# Patient Record
Sex: MALE | Race: WHITE | Employment: OTHER | URBAN - METROPOLITAN AREA
[De-identification: names, ages, dates, MRNs, and addresses within clinical notes are randomized per-mention and may not be internally consistent; named-entity substitution may affect disease eponyms.]

---

## 2023-03-05 ENCOUNTER — HOSPITAL ENCOUNTER (EMERGENCY)
Age: 53
Discharge: HOME | End: 2023-03-05
Attending: EMERGENCY MEDICINE
Payer: MEDICARE

## 2023-03-05 VITALS
BODY MASS INDEX: 42.66 KG/M2 | OXYGEN SATURATION: 96 % | HEART RATE: 65 BPM | SYSTOLIC BLOOD PRESSURE: 144 MMHG | WEIGHT: 315 LBS | RESPIRATION RATE: 16 BRPM | TEMPERATURE: 98.1 F | HEIGHT: 72 IN | DIASTOLIC BLOOD PRESSURE: 99 MMHG

## 2023-03-05 DIAGNOSIS — G47.00 INSOMNIA, UNSPECIFIED TYPE: Primary | ICD-10-CM

## 2023-03-05 LAB
ALBUMIN SERPL-MCNC: 3.6 G/DL (ref 3.5–5)
ALBUMIN/GLOB SERPL: 0.8 (ref 0.4–1.6)
ALP SERPL-CCNC: 108 U/L (ref 50–136)
ALT SERPL-CCNC: 25 U/L (ref 12–65)
AMPHET UR QL SCN: NEGATIVE
ANION GAP SERPL CALC-SCNC: 7 MMOL/L (ref 2–11)
APAP SERPL-MCNC: <2 UG/ML (ref 10–30)
AST SERPL-CCNC: 14 U/L (ref 15–37)
BARBITURATES UR QL SCN: NEGATIVE
BASOPHILS # BLD: 0.1 K/UL (ref 0–0.2)
BASOPHILS NFR BLD: 1 % (ref 0–2)
BENZODIAZ UR QL: NEGATIVE
BILIRUB SERPL-MCNC: 0.5 MG/DL (ref 0.2–1.1)
BUN SERPL-MCNC: 21 MG/DL (ref 6–23)
CALCIUM SERPL-MCNC: 9.6 MG/DL (ref 8.3–10.4)
CANNABINOIDS UR QL SCN: NEGATIVE
CHLORIDE SERPL-SCNC: 104 MMOL/L (ref 101–110)
CO2 SERPL-SCNC: 25 MMOL/L (ref 21–32)
COCAINE UR QL SCN: NEGATIVE
CREAT SERPL-MCNC: 1.17 MG/DL (ref 0.8–1.5)
DIFFERENTIAL METHOD BLD: ABNORMAL
EOSINOPHIL # BLD: 0.5 K/UL (ref 0–0.8)
EOSINOPHIL NFR BLD: 4 % (ref 0.5–7.8)
ERYTHROCYTE [DISTWIDTH] IN BLOOD BY AUTOMATED COUNT: 13.4 % (ref 11.9–14.6)
ETHANOL SERPL-MCNC: <3 MG/DL (ref 0–0.08)
GLOBULIN SER CALC-MCNC: 4.3 G/DL (ref 2.8–4.5)
GLUCOSE SERPL-MCNC: 181 MG/DL (ref 65–100)
HCT VFR BLD AUTO: 42.6 % (ref 41.1–50.3)
HGB BLD-MCNC: 14.2 G/DL (ref 13.6–17.2)
IMM GRANULOCYTES # BLD AUTO: 0 K/UL (ref 0–0.5)
IMM GRANULOCYTES NFR BLD AUTO: 0 % (ref 0–5)
LYMPHOCYTES # BLD: 3.1 K/UL (ref 0.5–4.6)
LYMPHOCYTES NFR BLD: 29 % (ref 13–44)
MAGNESIUM SERPL-MCNC: 1.9 MG/DL (ref 1.8–2.4)
MCH RBC QN AUTO: 28.1 PG (ref 26.1–32.9)
MCHC RBC AUTO-ENTMCNC: 33.3 G/DL (ref 31.4–35)
MCV RBC AUTO: 84.4 FL (ref 82–102)
METHADONE UR QL: NEGATIVE
MONOCYTES # BLD: 0.8 K/UL (ref 0.1–1.3)
MONOCYTES NFR BLD: 8 % (ref 4–12)
NEUTS SEG # BLD: 6.1 K/UL (ref 1.7–8.2)
NEUTS SEG NFR BLD: 58 % (ref 43–78)
NRBC # BLD: 0 K/UL (ref 0–0.2)
OPIATES UR QL: NEGATIVE
PCP UR QL: NEGATIVE
PLATELET # BLD AUTO: 253 K/UL (ref 150–450)
PMV BLD AUTO: 8 FL (ref 9.4–12.3)
POTASSIUM SERPL-SCNC: 3.7 MMOL/L (ref 3.5–5.1)
PROT SERPL-MCNC: 7.9 G/DL (ref 6.3–8.2)
RBC # BLD AUTO: 5.05 M/UL (ref 4.23–5.6)
SALICYLATES SERPL-MCNC: <1.7 MG/DL (ref 2.8–20)
SODIUM SERPL-SCNC: 136 MMOL/L (ref 133–143)
WBC # BLD AUTO: 10.5 K/UL (ref 4.3–11.1)

## 2023-03-05 PROCEDURE — 93005 ELECTROCARDIOGRAM TRACING: CPT | Performed by: EMERGENCY MEDICINE

## 2023-03-05 PROCEDURE — 99284 EMERGENCY DEPT VISIT MOD MDM: CPT

## 2023-03-05 PROCEDURE — 80307 DRUG TEST PRSMV CHEM ANLYZR: CPT

## 2023-03-05 PROCEDURE — 80179 DRUG ASSAY SALICYLATE: CPT

## 2023-03-05 PROCEDURE — 85025 COMPLETE CBC W/AUTO DIFF WBC: CPT

## 2023-03-05 PROCEDURE — 80053 COMPREHEN METABOLIC PANEL: CPT

## 2023-03-05 PROCEDURE — 80143 DRUG ASSAY ACETAMINOPHEN: CPT

## 2023-03-05 PROCEDURE — 83735 ASSAY OF MAGNESIUM: CPT

## 2023-03-05 PROCEDURE — 82077 ASSAY SPEC XCP UR&BREATH IA: CPT

## 2023-03-05 RX ORDER — LITHIUM CARBONATE 300 MG/1
1200 TABLET, FILM COATED, EXTENDED RELEASE ORAL DAILY
COMMUNITY

## 2023-03-05 RX ORDER — TRAZODONE HYDROCHLORIDE 50 MG/1
50 TABLET ORAL NIGHTLY
COMMUNITY

## 2023-03-05 RX ORDER — PROPRANOLOL HYDROCHLORIDE 20 MG/1
20 TABLET ORAL 3 TIMES DAILY
COMMUNITY

## 2023-03-05 RX ORDER — CLONAZEPAM 1 MG/1
1.5 TABLET ORAL 2 TIMES DAILY PRN
COMMUNITY

## 2023-03-05 RX ORDER — ZOLPIDEM TARTRATE 10 MG/1
TABLET ORAL NIGHTLY PRN
COMMUNITY

## 2023-03-05 ASSESSMENT — LIFESTYLE VARIABLES
HOW MANY STANDARD DRINKS CONTAINING ALCOHOL DO YOU HAVE ON A TYPICAL DAY: PATIENT DOES NOT DRINK
HOW OFTEN DO YOU HAVE A DRINK CONTAINING ALCOHOL: NEVER

## 2023-03-05 ASSESSMENT — PAIN - FUNCTIONAL ASSESSMENT
PAIN_FUNCTIONAL_ASSESSMENT: NONE - DENIES PAIN

## 2023-03-05 ASSESSMENT — PATIENT HEALTH QUESTIONNAIRE - PHQ9: SUM OF ALL RESPONSES TO PHQ QUESTIONS 1-9: 18

## 2023-03-05 ASSESSMENT — ENCOUNTER SYMPTOMS
RESPIRATORY NEGATIVE: 1
GASTROINTESTINAL NEGATIVE: 1

## 2023-03-05 ASSESSMENT — PAIN SCALES - GENERAL: PAINLEVEL_OUTOF10: 0

## 2023-03-05 NOTE — ED TRIAGE NOTES
Patient was prescribed zolpidem 10 mg 30 pills on 3/3/23 for insomnia. Patient has taken 26 pills since filling prescription. Patient reports a brief period on Friday where he was able to sleep for 3 hrs. Patient denies any S.I. at this time.

## 2023-03-05 NOTE — ED PROVIDER NOTES
Emergency Department Provider Note                   PCP:                No primary care provider on file. Age: 47 y.o. Sex: male     DISPOSITION Decision To Discharge 03/05/2023 08:51:47 PM       ICD-10-CM    1. Insomnia, unspecified type  G47.00           MEDICAL DECISION MAKING  Complexity of Problems Addressed:  1 or more acute illnesses that pose a threat to life or bodily function. Data Reviewed and Analyzed:  Category 1:   I reviewed records from an external source: provider visit notes from outside specialist.  I ordered each unique test.  I reviewed the results of each unique test.        Category 2:   I independently ordered and reviewed the EKG. Category 3: Discussion of management or test interpretation. Patient is 52-year-old male who took several Ambien before arrival trying to sleep. He started to feel high so he wanted to be evaluated. Adamantly denies trying to hurt himself. I spoke to poison control who recommended 4 to 6-hour observation. They recommended supportive treatment. Did obtain psychiatric evaluation because patient has been taking way too much Ambien the past few days. Psychiatry has evaluated the patient and do believe he stable for discharge. He has an appointment with psychiatry and primary care tomorrow. Encouraged him to take his medication only as prescribed. He is to return if worsening. He is awake alert ambulatory around the department without difficulty. Work-up unrevealing in the emergency department. ED Course as of 03/05/23 2054   [facility] Mar 05, 2023   1817 EKG 12 Lead  EKG shows sinus rhythm at rate of 65. No acute ischemic changes. No STEMI. QTc 457. [SARA]   1240 I spoke to poison control who recommends 4-6hr obs, if symptomatic, prolonged obs. Otherwise, if awake, alert, stable vitals, OK to go home. [SARA]   2051 Telepsychiatry has evaluated patient and believes that he is stable for discharge.   Patient has now been observed for 4 hours is awake alert and oriented feeling much better. Ambulatory without difficulty. Stable vital signs. Will discharge. He has follow-up tomorrow with psychiatrist and primary doctor. Strict return precautions given. [SARA]      ED Course User Index  [SARA] KERRIE Rivas       Risk of Complications and/or Morbidity of Patient Management:  Discussion with external consultants     Sisi Mercado is a 47 y.o. male who presents to the Emergency Department with chief complaint of    Chief Complaint   Patient presents with    Insomnia      Patient is a 52-year-old male with history of insomnia, bipolar, anxiety. He presents stating that he feels high after he took 7 Ambien within a 3-hour period prior to arrival.  He says he took 2 Klonopin with 2 Ambien at 1 PM, and at 3 PM took 3 Ambien, and then around 4 or 5 PM he took 2 more Ambien. His Klonopin of 1 mg and his Ambien or 10 mg. He says that yesterday he took 12 Ambien throughout the day, and the day prior took 5 Ambien. He tells me he is not suicidal and has no thoughts of hurting himself, but just is trying to sleep. I reached out to poison control who recommended a 4 to 6-hour observation, and if he is awake and alert at that point in time he could be discharged. Recommended supportive treatment meantime. Review of Systems   Constitutional:  Positive for fatigue. HENT: Negative. Respiratory: Negative. Cardiovascular: Negative. Gastrointestinal: Negative. Genitourinary: Negative. All other systems reviewed and are negative. Vitals signs and nursing note reviewed. Patient Vitals for the past 4 hrs:   Temp Pulse Resp BP SpO2   03/05/23 1733 98.1 °F (36.7 °C) 65 18 125/78 97 %          Physical Exam  Vitals and nursing note reviewed. Constitutional:       General: He is not in acute distress. Appearance: Normal appearance. He is obese. He is not ill-appearing or toxic-appearing. HENT:      Head: Normocephalic.    Eyes: Extraocular Movements: Extraocular movements intact. Cardiovascular:      Rate and Rhythm: Normal rate and regular rhythm. Pulses: Normal pulses. Heart sounds: Normal heart sounds. Pulmonary:      Effort: Pulmonary effort is normal.      Breath sounds: Normal breath sounds. Abdominal:      Palpations: Abdomen is soft. Tenderness: There is no abdominal tenderness. Musculoskeletal:         General: No swelling or tenderness. Normal range of motion. Cervical back: Normal range of motion and neck supple. Skin:     General: Skin is warm and dry. Findings: No rash. Neurological:      General: No focal deficit present. Mental Status: He is alert and oriented to person, place, and time. Mental status is at baseline. Comments: Awake alert and oriented. Appears tired. Conversant. Psychiatric:         Mood and Affect: Mood normal.         Behavior: Behavior normal.         Thought Content: Thought content normal.        Procedures     Orders Placed This Encounter   Procedures    CBC with Auto Differential    CMP    Salicylate    Acetaminophen Level    ETOH    Magnesium    Urine Drug Screen    Complete CSSRS Screen    Complete SBIRT Screen    POCT Urine Dipstick    EKG 12 Lead        Medications - No data to display    New Prescriptions    No medications on file        Past Medical History:   Diagnosis Date    Anxiety     Bipolar 1 disorder, depressed ([address]         History reviewed. No pertinent surgical history. History reviewed. No pertinent family history.      Social History     Socioeconomic History    Marital status: Single     Spouse name: None    Number of children: None    Years of education: None    Highest education level: None   Tobacco Use    Smoking status: Former     Types: Cigarettes     Quit date: 2023     Years since quittin.0    Smokeless tobacco: Never   Vaping Use    Vaping Use: Never used   Substance and Sexual Activity    Alcohol use: Not Currently    Drug use: Not Currently        Allergies: Pcn [penicillins]    Previous Medications    CLONAZEPAM (KLONOPIN) 1 MG TABLET    Take 1.5 mg by mouth 2 times daily as needed. LITHIUM (LITHOBID) 300 MG EXTENDED RELEASE TABLET    Take 1,200 mg by mouth Daily    LURASIDONE (LATUDA) 120 MG TABLET    Take 120 mg by mouth daily    METFORMIN (GLUCOPHAGE) 1000 MG TABLET    Take 1,000 mg by mouth 2 times daily (with meals)    PROPRANOLOL (INDERAL) 20 MG TABLET    Take 20 mg by mouth 3 times daily    TRAZODONE (DESYREL) 50 MG TABLET    Take 50 mg by mouth nightly    ZOLPIDEM (AMBIEN) 10 MG TABLET    Take by mouth nightly as needed for Sleep.         Results for orders placed or performed during the hospital encounter of 03/05/23   CBC with Auto Differential   Result Value Ref Range    WBC 10.5 4.3 - 11.1 K/uL    RBC 5.05 4.23 - 5.6 M/uL    Hemoglobin 14.2 13.6 - 17.2 g/dL    Hematocrit 42.6 41.1 - 50.3 %    MCV 84.4 82.0 - 102.0 FL    MCH 28.1 26.1 - 32.9 PG    MCHC 33.3 31.4 - 35.0 g/dL    RDW 13.4 11.9 - 14.6 %    Platelets 897 908 - 201 K/uL    MPV 8.0 (L) 9.4 - 12.3 FL    nRBC 0.00 0.0 - 0.2 K/uL    Differential Type AUTOMATED      Seg Neutrophils 58 43 - 78 %    Lymphocytes 29 13 - 44 %    Monocytes 8 4.0 - 12.0 %    Eosinophils % 4 0.5 - 7.8 %    Basophils 1 0.0 - 2.0 %    Immature Granulocytes 0 0.0 - 5.0 %    Segs Absolute 6.1 1.7 - 8.2 K/UL    Absolute Lymph # 3.1 0.5 - 4.6 K/UL    Absolute Mono # 0.8 0.1 - 1.3 K/UL    Absolute Eos # 0.5 0.0 - 0.8 K/UL    Basophils Absolute 0.1 0.0 - 0.2 K/UL    Absolute Immature Granulocyte 0.0 0.0 - 0.5 K/UL   CMP   Result Value Ref Range    Sodium 136 133 - 143 mmol/L    Potassium 3.7 3.5 - 5.1 mmol/L    Chloride 104 101 - 110 mmol/L    CO2 25 21 - 32 mmol/L    Anion Gap 7 2 - 11 mmol/L    Glucose 181 (H) 65 - 100 mg/dL    BUN 21 6 - 23 MG/DL    Creatinine 1.17 0.8 - 1.5 MG/DL    Est, Glom Filt Rate >60 >60 ml/min/1.73m2    Calcium 9.6 8.3 - 10.4 MG/DL    Total Bilirubin 0.5 0.2 - 1.1 MG/DL    ALT 25 12 - 65 U/L    AST 14 (L) 15 - 37 U/L    Alk Phosphatase 108 50 - 136 U/L    Total Protein 7.9 6.3 - 8.2 g/dL    Albumin 3.6 3.5 - 5.0 g/dL    Globulin 4.3 2.8 - 4.5 g/dL    Albumin/Globulin Ratio 0.8 0.4 - 1.6     Salicylate   Result Value Ref Range    Salicylate, Serum <5.8 (L) 2.8 - 20.0 MG/DL   Acetaminophen Level   Result Value Ref Range    Acetaminophen Level <2 (L) 10 - 30 ug/mL   ETOH   Result Value Ref Range    Ethanol Lvl <3 MG/DL   Magnesium   Result Value Ref Range    Magnesium 1.9 1.8 - 2.4 mg/dL   Urine Drug Screen   Result Value Ref Range    PCP, Urine Negative      Benzodiazepines, Urine Negative      Cocaine, Urine Negative      Amphetamine, Urine Negative      Methadone, Urine Negative      THC, TH-Cannabinol, Urine Negative      Opiates, Urine Negative      Barbiturates, Urine Negative          No orders to display                     Voice dictation software was used during the making of this note. This software is not perfect and grammatical and other typographical errors may be present. This note has not been completely proofread for errors.        Juan Pablo Trevino  03/05/23 2054

## 2023-03-06 LAB
EKG ATRIAL RATE: 65 BPM
EKG DIAGNOSIS: NORMAL
EKG P AXIS: 17 DEGREES
EKG P-R INTERVAL: 179 MS
EKG Q-T INTERVAL: 439 MS
EKG QRS DURATION: 103 MS
EKG QTC CALCULATION (BAZETT): 457 MS
EKG R AXIS: 25 DEGREES
EKG T AXIS: 67 DEGREES
EKG VENTRICULAR RATE: 65 BPM

## 2023-03-06 NOTE — DISCHARGE INSTRUCTIONS
See your doctor tomorrow for close follow up. Return if worsening. Only take medications as prescribed.

## 2023-03-12 ENCOUNTER — HOSPITAL ENCOUNTER (EMERGENCY)
Age: 53
Discharge: HOME | End: 2023-03-13
Attending: EMERGENCY MEDICINE
Payer: MEDICARE

## 2023-03-12 DIAGNOSIS — G47.00 INSOMNIA, UNSPECIFIED TYPE: Primary | ICD-10-CM

## 2023-03-12 LAB
ANION GAP SERPL CALC-SCNC: 8 MMOL/L (ref 2–11)
BUN SERPL-MCNC: 16 MG/DL (ref 6–23)
CALCIUM SERPL-MCNC: 9.4 MG/DL (ref 8.3–10.4)
CHLORIDE SERPL-SCNC: 105 MMOL/L (ref 101–110)
CO2 SERPL-SCNC: 24 MMOL/L (ref 21–32)
CREAT SERPL-MCNC: 1.03 MG/DL (ref 0.8–1.5)
ERYTHROCYTE [DISTWIDTH] IN BLOOD BY AUTOMATED COUNT: 13 % (ref 11.9–14.6)
GLUCOSE SERPL-MCNC: 229 MG/DL (ref 65–100)
HCT VFR BLD AUTO: 42.9 % (ref 41.1–50.3)
HGB BLD-MCNC: 14.7 G/DL (ref 13.6–17.2)
MCH RBC QN AUTO: 28.3 PG (ref 26.1–32.9)
MCHC RBC AUTO-ENTMCNC: 34.3 G/DL (ref 31.4–35)
MCV RBC AUTO: 82.5 FL (ref 82–102)
NRBC # BLD: 0 K/UL (ref 0–0.2)
PLATELET # BLD AUTO: 257 K/UL (ref 150–450)
PMV BLD AUTO: 8 FL (ref 9.4–12.3)
POTASSIUM SERPL-SCNC: 4.3 MMOL/L (ref 3.5–5.1)
RBC # BLD AUTO: 5.2 M/UL (ref 4.23–5.6)
SODIUM SERPL-SCNC: 137 MMOL/L (ref 133–143)
WBC # BLD AUTO: 9.1 K/UL (ref 4.3–11.1)

## 2023-03-12 PROCEDURE — 85027 COMPLETE CBC AUTOMATED: CPT

## 2023-03-12 PROCEDURE — 6370000000 HC RX 637 (ALT 250 FOR IP): Performed by: EMERGENCY MEDICINE

## 2023-03-12 PROCEDURE — 80048 BASIC METABOLIC PNL TOTAL CA: CPT

## 2023-03-12 PROCEDURE — 80178 ASSAY OF LITHIUM: CPT

## 2023-03-12 PROCEDURE — 99283 EMERGENCY DEPT VISIT LOW MDM: CPT | Performed by: EMERGENCY MEDICINE

## 2023-03-12 RX ORDER — PROPRANOLOL HYDROCHLORIDE 20 MG/1
20 TABLET ORAL 3 TIMES DAILY
Qty: 9 TABLET | Refills: 0 | Status: SHIPPED | OUTPATIENT
Start: 2023-03-12 | End: 2023-03-15

## 2023-03-12 RX ORDER — PROPRANOLOL HYDROCHLORIDE 10 MG/1
20 TABLET ORAL 3 TIMES DAILY
Status: DISCONTINUED | OUTPATIENT
Start: 2023-03-12 | End: 2023-03-13 | Stop reason: HOSPADM

## 2023-03-12 RX ORDER — CHLORDIAZEPOXIDE HYDROCHLORIDE 25 MG/1
25 CAPSULE, GELATIN COATED ORAL
Status: COMPLETED | OUTPATIENT
Start: 2023-03-12 | End: 2023-03-12

## 2023-03-12 RX ORDER — DIAZEPAM 2 MG/1
2 TABLET ORAL EVERY 12 HOURS PRN
Qty: 10 TABLET | Refills: 0 | Status: SHIPPED | OUTPATIENT
Start: 2023-03-12 | End: 2023-03-17

## 2023-03-12 RX ORDER — DIAZEPAM 5 MG/1
5 TABLET ORAL EVERY 6 HOURS PRN
Status: DISCONTINUED | OUTPATIENT
Start: 2023-03-12 | End: 2023-03-13 | Stop reason: HOSPADM

## 2023-03-12 RX ADMIN — CHLORDIAZEPOXIDE HYDROCHLORIDE 25 MG: 25 CAPSULE ORAL at 23:18

## 2023-03-12 RX ADMIN — DIAZEPAM 5 MG: 5 TABLET ORAL at 22:00

## 2023-03-12 RX ADMIN — PROPRANOLOL HYDROCHLORIDE 20 MG: 10 TABLET ORAL at 22:15

## 2023-03-12 ASSESSMENT — ENCOUNTER SYMPTOMS
SHORTNESS OF BREATH: 0
DIARRHEA: 0
COUGH: 0
NAUSEA: 0
VOMITING: 0

## 2023-03-12 ASSESSMENT — PAIN - FUNCTIONAL ASSESSMENT: PAIN_FUNCTIONAL_ASSESSMENT: NONE - DENIES PAIN

## 2023-03-13 VITALS
TEMPERATURE: 98 F | RESPIRATION RATE: 20 BRPM | DIASTOLIC BLOOD PRESSURE: 80 MMHG | HEIGHT: 72 IN | BODY MASS INDEX: 42.66 KG/M2 | WEIGHT: 315 LBS | HEART RATE: 96 BPM | SYSTOLIC BLOOD PRESSURE: 136 MMHG | OXYGEN SATURATION: 96 %

## 2023-03-13 LAB — LITHIUM SERPL-SCNC: 0.6 MMOL/L (ref 0.6–1.2)

## 2023-03-13 NOTE — ED PROVIDER NOTES
Emergency Department Provider Note                   PCP:                No primary care provider on file. Age: 47 y.o. Sex: male     DISPOSITION       No diagnosis found. MEDICAL DECISION MAKING  I will give him doses of Valium and Librium in the ER to try to gaston off any benzodiazepine withdrawal.  He is also out of his propranolol so I will give him a dose of that in the emergency department. His initial blood work is unremarkable. The lithium level will take some time but given the fact that his kidney function and potassium are normal I am not as concerned about the lithium level. I will plan to discharge him home with a 2-day supply of Valium and a prescription for some doxylamine for sleep. Complexity of Problems Addressed:  1 or more chronic illnesses with a severe exacerbation or progression. Data Reviewed and Analyzed:  Category 1:   I reviewed records from an external source: provider visit notes from outside specialist.  I ordered each unique test.  I reviewed the results of each unique test.        Category 2:       Category 3: Discussion of management or test interpretation. See above       Risk of Complications and/or Morbidity of Patient Management:  Prescription drug management performed     Whitney Nicholson is a 47 y.o. male who presents to the Emergency Department with chief complaint of    Chief Complaint   Patient presents with    Insomnia      60-year-old gentleman presents with concerns about insomnia. He says he is not getting more than about 45 minutes to an hour of sleep every night. He says with this he is having some racing thoughts and he feels like he is a little manic. He has tried Ambien and trazodone without any relief. He says that in an effort to try to get some sleep he doubled up on his Klonopin for about 4 nights in a row and it did not help. He says he is now out of his Klonopin and he is worried he might be withdrawing.     He denies any fevers or chills. He has had no nausea, vomiting, or diarrhea. Elements of this note were created using speech recognition software. As such, errors of speech recognition may be present. Review of Systems   Constitutional:  Negative for chills and fever. Respiratory:  Negative for cough and shortness of breath. Gastrointestinal:  Negative for diarrhea, nausea and vomiting. Genitourinary:  Negative for dysuria and hematuria. Psychiatric/Behavioral:  Positive for dysphoric mood and sleep disturbance. Negative for agitation, decreased concentration, self-injury and suicidal ideas. The patient is nervous/anxious. Vitals signs and nursing note reviewed. Patient Vitals for the past 4 hrs:   Temp Pulse Resp BP SpO2   03/12/23 2215 -- 91 -- (!) 141/84 --   03/12/23 2138 98 °F (36.7 °C) 93 18 (!) 163/81 96 %          Physical Exam  Vitals and nursing note reviewed. Constitutional:       Appearance: Normal appearance. Cardiovascular:      Rate and Rhythm: Normal rate and regular rhythm. Heart sounds: Normal heart sounds. Pulmonary:      Effort: Pulmonary effort is normal.      Breath sounds: Normal breath sounds. Musculoskeletal:      Right lower leg: No edema. Left lower leg: No edema. Neurological:      General: No focal deficit present. Mental Status: He is alert and oriented to person, place, and time. Psychiatric:         Behavior: Behavior normal.         Thought Content:  Thought content normal.        Procedures     Orders Placed This Encounter   Procedures    CBC    Basic Metabolic Panel    Lithium Level        Medications   diazePAM (VALIUM) tablet 5 mg (5 mg Oral Given 3/12/23 2200)   propranolol (INDERAL) tablet 20 mg (20 mg Oral Given 3/12/23 2215)   chlordiazePOXIDE (LIBRIUM) capsule 25 mg (25 mg Oral Given 3/12/23 2318)       New Prescriptions    No medications on file        Past Medical History:   Diagnosis Date    Anxiety     Bipolar 1 disorder, depressed ([facility]         No past surgical history on file. No family history on file. Social History     Socioeconomic History    Marital status: Single   Tobacco Use    Smoking status: Former     Types: Cigarettes     Quit date: 2023     Years since quittin.0    Smokeless tobacco: Never   Vaping Use    Vaping Use: Never used   Substance and Sexual Activity    Alcohol use: Not Currently    Drug use: Not Currently        Allergies: Pcn [penicillins]    Previous Medications    CLONAZEPAM (KLONOPIN) 1 MG TABLET    Take 1.5 mg by mouth 2 times daily as needed. LITHIUM (LITHOBID) 300 MG EXTENDED RELEASE TABLET    Take 1,200 mg by mouth Daily    LURASIDONE (LATUDA) 120 MG TABLET    Take 120 mg by mouth daily    METFORMIN (GLUCOPHAGE) 1000 MG TABLET    Take 1,000 mg by mouth 2 times daily (with meals)    PROPRANOLOL (INDERAL) 20 MG TABLET    Take 20 mg by mouth 3 times daily    TRAZODONE (DESYREL) 50 MG TABLET    Take 50 mg by mouth nightly    ZOLPIDEM (AMBIEN) 10 MG TABLET    Take by mouth nightly as needed for Sleep.         Results for orders placed or performed during the hospital encounter of 23   CBC   Result Value Ref Range    WBC 9.1 4.3 - 11.1 K/uL    RBC 5.20 4.23 - 5.6 M/uL    Hemoglobin 14.7 13.6 - 17.2 g/dL    Hematocrit 42.9 41.1 - 50.3 %    MCV 82.5 82.0 - 102.0 FL    MCH 28.3 26.1 - 32.9 PG    MCHC 34.3 31.4 - 35.0 g/dL    RDW 13.0 11.9 - 14.6 %    Platelets 918 804 - 221 K/uL    MPV 8.0 (L) 9.4 - 12.3 FL    nRBC 0.00 0.0 - 0.2 K/uL   Basic Metabolic Panel   Result Value Ref Range    Sodium 137 133 - 143 mmol/L    Potassium 4.3 3.5 - 5.1 mmol/L    Chloride 105 101 - 110 mmol/L    CO2 24 21 - 32 mmol/L    Anion Gap 8 2 - 11 mmol/L    Glucose 229 (H) 65 - 100 mg/dL    BUN 16 6 - 23 MG/DL    Creatinine 1.03 0.8 - 1.5 MG/DL    Est, Glom Filt Rate >60 >60 ml/min/1.73m2    Calcium 9.4 8.3 - 10.4 MG/DL        No orders to display                     Voice dictation software was used during the making of this note. This software is not perfect and grammatical and other typographical errors may be present. This note has not been completely proofread for errors.         Roselia Peck MD  03/12/23 5439

## 2023-03-13 NOTE — DISCHARGE INSTRUCTIONS
Return with any fevers vomiting, difficulty breathing, worsening symptoms, or additional concerns. Follow-up with your regular doctors as planned.

## 2023-03-13 NOTE — ED NOTES
I have reviewed discharge instructions with the patient. The patient verbalized understanding. Patient left ED via Discharge Method: ambulatory to Home with self called round trip via through web site to get pt, est time 0100    Opportunity for questions and clarification provided. Patient given 3 scripts. To continue your aftercare when you leave the hospital, you may receive an automated call from our care team to check in on how you are doing. This is a free service and part of our promise to provide the best care and service to meet your aftercare needs.  If you have questions, or wish to unsubscribe from this service please call [phone]. Thank you for Choosing our [facility] [place] [address] Emergency Department.         Carolyn Lozoya RN  03/13/23 1966

## 2023-03-13 NOTE — ED NOTES
Pt reports taking over the counter meds for sleep and has tried Ambien as well for sleep and nothing is helping .  Here tonight via ems requesting something to help s/s times 1 mth     Vinod Tuttle RN  03/12/23 8007

## 2023-03-13 NOTE — ED NOTES
RN went to assess pt, place IV and draw labs. Pt was very firm and agitated with RN rushing RN to hurry and kept telling RN where pt wanted IV started and per pt \" I just need something to make me sleep, morphine and a CPAP will help me sleep\" Pt continued to be verbally agitated with RN, RN called in another RN to help draw labs since pt was not wanting to be cooperative with out RN constantly asking pt to reposition arm for lab draw. PO meds given, IV started and labs drawn.       Ana Mckeon RN  03/12/23 9926

## 2023-03-13 NOTE — ED TRIAGE NOTES
Pt reports ongoing sleep problems. States I just need help going to sleep. Pt with history of bipolar on lithium and latuda. Pt reports couldn't sleep so called EMS to come in.